# Patient Record
Sex: FEMALE | Race: BLACK OR AFRICAN AMERICAN | NOT HISPANIC OR LATINO | Employment: UNEMPLOYED | URBAN - METROPOLITAN AREA
[De-identification: names, ages, dates, MRNs, and addresses within clinical notes are randomized per-mention and may not be internally consistent; named-entity substitution may affect disease eponyms.]

---

## 2017-08-31 ENCOUNTER — OFFICE VISIT (OUTPATIENT)
Dept: OBSTETRICS AND GYNECOLOGY | Facility: CLINIC | Age: 22
End: 2017-08-31

## 2017-08-31 VITALS
HEIGHT: 68 IN | DIASTOLIC BLOOD PRESSURE: 74 MMHG | WEIGHT: 138 LBS | HEART RATE: 86 BPM | SYSTOLIC BLOOD PRESSURE: 119 MMHG | BODY MASS INDEX: 20.92 KG/M2

## 2017-08-31 DIAGNOSIS — F17.200 TOBACCO DEPENDENCE: ICD-10-CM

## 2017-08-31 DIAGNOSIS — Z01.419 VISIT FOR GYNECOLOGIC EXAMINATION: ICD-10-CM

## 2017-08-31 DIAGNOSIS — Z11.3 SCREENING FOR STD (SEXUALLY TRANSMITTED DISEASE): Primary | ICD-10-CM

## 2017-08-31 PROCEDURE — 99406 BEHAV CHNG SMOKING 3-10 MIN: CPT | Performed by: OBSTETRICS & GYNECOLOGY

## 2017-08-31 PROCEDURE — 99385 PREV VISIT NEW AGE 18-39: CPT | Performed by: OBSTETRICS & GYNECOLOGY

## 2017-08-31 RX ORDER — NORGESTIMATE AND ETHINYL ESTRADIOL 0.25-0.035
1 KIT ORAL DAILY
Qty: 1 PACKAGE | Refills: 11 | Status: SHIPPED | OUTPATIENT
Start: 2017-08-31 | End: 2018-09-17 | Stop reason: HOSPADM

## 2017-09-01 LAB
HBV SURFACE AG SERPL QL IA: NEGATIVE
HIV 1+2 AB+HIV1 P24 AG SERPL QL IA: NON REACTIVE
RPR SER QL: NORMAL

## 2017-09-04 LAB
C TRACH RRNA SPEC QL NAA+PROBE: NEGATIVE
N GONORRHOEA RRNA SPEC QL NAA+PROBE: NEGATIVE
T VAGINALIS RRNA SPEC QL NAA+PROBE: NEGATIVE

## 2017-09-06 LAB
CYTOLOGIST CVX/VAG CYTO: ABNORMAL
CYTOLOGY CVX/VAG DOC THIN PREP: ABNORMAL
DX ICD CODE: ABNORMAL
DX ICD CODE: ABNORMAL
HIV 1 & 2 AB SER-IMP: ABNORMAL
HPV I/H RISK 4 DNA CVX QL PROBE+SIG AMP: POSITIVE
OTHER STN SPEC: ABNORMAL
PATH REPORT.FINAL DX SPEC: ABNORMAL
PATHOLOGIST CVX/VAG CYTO: ABNORMAL
RECOM F/U CVX/VAG CYTO: ABNORMAL
STAT OF ADQ CVX/VAG CYTO-IMP: ABNORMAL

## 2017-09-07 ENCOUNTER — TELEPHONE (OUTPATIENT)
Dept: OBSTETRICS AND GYNECOLOGY | Facility: CLINIC | Age: 22
End: 2017-09-07

## 2017-09-28 ENCOUNTER — PROCEDURE VISIT (OUTPATIENT)
Dept: OBSTETRICS AND GYNECOLOGY | Facility: CLINIC | Age: 22
End: 2017-09-28

## 2017-09-28 VITALS
SYSTOLIC BLOOD PRESSURE: 105 MMHG | HEIGHT: 68 IN | DIASTOLIC BLOOD PRESSURE: 67 MMHG | BODY MASS INDEX: 21.52 KG/M2 | HEART RATE: 78 BPM | WEIGHT: 142 LBS

## 2017-09-28 DIAGNOSIS — R87.612 LGSIL ON PAP SMEAR OF CERVIX: Primary | ICD-10-CM

## 2017-09-28 DIAGNOSIS — F17.200 TOBACCO USE DISORDER: ICD-10-CM

## 2017-09-28 PROCEDURE — 57454 BX/CURETT OF CERVIX W/SCOPE: CPT | Performed by: OBSTETRICS & GYNECOLOGY

## 2017-09-28 PROCEDURE — 99406 BEHAV CHNG SMOKING 3-10 MIN: CPT | Performed by: OBSTETRICS & GYNECOLOGY

## 2017-09-28 NOTE — PROGRESS NOTES
Colposcopy Procedure Note    Indications: Pap smear 1 months ago showed: low-grade squamous intraepithelial neoplasia (LGSIL - encompassing HPV,mild dysplasia,ARELIS I). The prior pap showed no abnormalities.  Prior cervical/vaginal disease: none. Prior cervical treatment: no treatment.    Procedure Details   The risks and benefits of the procedure and Verbal informed consent obtained.    Speculum placed in vagina and excellent visualization of cervix achieved, cervix swabbed x 3 with acetic acid solution.    Findings:  Cervix: acetowhite lesion(s) noted at 10 o'clock; SCJ visualized - lesion at 10 o'clock, endocervical curettage performed, cervical biopsies taken at 10 o'clock, specimen labelled and sent to pathology and hemostasis achieved with Monsel's solution.  Vaginal inspection: normal without visible lesions.  Vulvar colposcopy: vulvar colposcopy not performed.    Specimens: Ectocervical biopsy and endocervical curettage    Complications: none.    Plan:  Specimens labelled and sent to Pathology.  Will base further treatment on Pathology findings.  Treatment options discussed with patient.    Discussed relationship of HPV and tobacco/smoking behaviors with abnormal pap tests.  I discussed quitting and importance of doing so.  Discussed strategies to quit.  Greater than 3 minutes was spent on this topic.    Arnav Bingham MD

## 2017-10-02 LAB
DX ICD CODE: NORMAL
DX ICD CODE: NORMAL
PATH REPORT.FINAL DX SPEC: NORMAL
PATH REPORT.GROSS SPEC: NORMAL
PATH REPORT.SITE OF ORIGIN SPEC: NORMAL
PATHOLOGIST NAME: NORMAL
PAYMENT PROCEDURE: NORMAL

## 2017-10-05 ENCOUNTER — TELEPHONE (OUTPATIENT)
Dept: OBSTETRICS AND GYNECOLOGY | Facility: CLINIC | Age: 22
End: 2017-10-05

## 2017-10-05 NOTE — TELEPHONE ENCOUNTER
----- Message from rAnav Bingham MD sent at 10/5/2017  4:17 PM EDT -----  LAW - Let her know that her testing reveals pre-cancer that likely needs treatment.  Please find out if she can come in tomorrow or Monday to discuss.  Thanks.

## 2017-10-05 NOTE — TELEPHONE ENCOUNTER
----- Message from Arnav Bingham MD sent at 10/5/2017  4:17 PM EDT -----  LAW - Let her know that her testing reveals pre-cancer that likely needs treatment.  Please find out if she can come in tomorrow or Monday to discuss.  Thanks.

## 2017-10-06 ENCOUNTER — OFFICE VISIT (OUTPATIENT)
Dept: OBSTETRICS AND GYNECOLOGY | Facility: CLINIC | Age: 22
End: 2017-10-06

## 2017-10-06 VITALS
WEIGHT: 140 LBS | BODY MASS INDEX: 21.22 KG/M2 | SYSTOLIC BLOOD PRESSURE: 110 MMHG | HEIGHT: 68 IN | DIASTOLIC BLOOD PRESSURE: 60 MMHG

## 2017-10-06 DIAGNOSIS — N87.9 DYSPLASIA OF CERVIX: Primary | ICD-10-CM

## 2017-10-06 PROCEDURE — 99214 OFFICE O/P EST MOD 30 MIN: CPT | Performed by: OBSTETRICS & GYNECOLOGY

## 2017-10-07 NOTE — PROGRESS NOTES
"Subjective   Dionne Ag is a 22 y.o. female.     Cc:  Pap test follow up    History of Present Illness - Patient is a 22 year old female who underwent colposcopy for an abnormal pap test.  The pap was LGSIL.  Patient underwent colposcopy.  The diagnosis was moderate dysplasia on ectocervical biopsy and positive ECC.  Patient presented for follow up.    The following portions of the patient's history were reviewed and updated as appropriate:   She  reports that she has been smoking.  She has been smoking about 0.50 packs per day. She has never used smokeless tobacco. She reports that she drinks alcohol. She reports that she uses illicit drugs, including Marijuana.  Current Outpatient Prescriptions   Medication Sig Dispense Refill   • amphetamine-dextroamphetamine XR (ADDERALL XR) 10 MG 24 hr capsule Take 25 mg by mouth Every Morning.     • famciclovir (FAMVIR) 250 MG tablet Take 1 tablet by mouth 2 (Two) Times a Day. 21 tablet 0   • norgestimate-ethinyl estradiol (SPRINTEC 28) 0.25-35 MG-MCG per tablet Take 1 tablet by mouth Daily. 1 package 11   • UNKNOWN TO PATIENT \"herpes medicine\"       No current facility-administered medications for this visit.      She is allergic to augmentin [amoxicillin-pot clavulanate]..    Review of Systems   Genitourinary: Negative for vaginal bleeding and vaginal discharge.       Objective   Physical Exam   Constitutional: She appears well-developed and well-nourished.   Psychiatric: She has a normal mood and affect. Her behavior is normal. Judgment and thought content normal.   Vitals reviewed.      Assessment/Plan   Dionne was seen today for follow-up.    Diagnoses and all orders for this visit:    Dysplasia of cervix  -  Discussed the implications of the diagnosis.  Discussed the general biopsy and pathology of dysplasia, related to HPV and the natural course.  Discussed risks of smoking and HPV.  I offered patient two strategies for management 1) Conservative management with " colposcopy in 6 months.  Discussed pros/cons.  2) Cervical Conization.  Discussed the nature of the procedure and risks.  Discussed risks of  birth/cervical incompetence.  Discussed cure rate and follow up.    I spent 25 minutes with patient and 20 minutes in face to face counseling.    Arnav Bingham MD

## 2017-12-07 ENCOUNTER — OFFICE VISIT (OUTPATIENT)
Dept: OBSTETRICS AND GYNECOLOGY | Facility: CLINIC | Age: 22
End: 2017-12-07

## 2017-12-07 VITALS
BODY MASS INDEX: 34.02 KG/M2 | SYSTOLIC BLOOD PRESSURE: 118 MMHG | HEART RATE: 87 BPM | HEIGHT: 55 IN | WEIGHT: 147 LBS | DIASTOLIC BLOOD PRESSURE: 82 MMHG

## 2017-12-07 DIAGNOSIS — N89.8 VAGINAL DISCHARGE: Primary | ICD-10-CM

## 2017-12-07 DIAGNOSIS — Z72.0 TOBACCO USE: ICD-10-CM

## 2017-12-07 DIAGNOSIS — Z86.19 H/O HERPES GENITALIS: ICD-10-CM

## 2017-12-07 DIAGNOSIS — N76.4 CARBUNCLE OF VULVA: ICD-10-CM

## 2017-12-07 PROCEDURE — 99406 BEHAV CHNG SMOKING 3-10 MIN: CPT | Performed by: OBSTETRICS & GYNECOLOGY

## 2017-12-07 PROCEDURE — 99214 OFFICE O/P EST MOD 30 MIN: CPT | Performed by: OBSTETRICS & GYNECOLOGY

## 2017-12-07 NOTE — PROGRESS NOTES
"Subjective   Dionne Ag is a 22 y.o. female.     Cc:  Multiple somatic complaints.  Vaginal discharge, recurrent boils, herpes, pap issues, stomach issues, mood issues.    History of Present Illness - 22 year old female with multiple complaints.  Three main issues covered today.  1) Vaginal discharge.  Patient with foul discharge over the past several weeks.  Unsure of the inciting agent or cause.  Patient is not sexually active and no known exposure to STD.  Has not tried any OTC medications.  No recent improvements.  2) vulvar \"boils.\"  Patient has \"waxed\" her vulva for several years.  Recently, developed \"boils\" in the vulvar area.  This is a new issue.  She managed them conservatively and they improved on their own.  Does not think they are related to HSV.  3)  Herpes.  Patient with diagnosis of HSV 1.5 years ago.  She was treated by PCP for what she feels was an unusual course.  Has not had a recurrence.  Feels that other doctors have given her different advice on HSV.  Patient also gave an extended, non-cohesive complaint/discussion of stomach and mood issues, for which she is trying to get help from PCP and other specialists.  Her diet seems suboptimal to me, but she was disagreeable with my view.    The following portions of the patient's history were reviewed and updated as appropriate:   She  has a past medical history of ADD (attention deficit disorder); Herpes; and Substance abuse.  She  reports that she has never smoked. She has never used smokeless tobacco. She reports that she drinks alcohol. She reports that she uses illicit drugs, including Marijuana.  Current Outpatient Prescriptions   Medication Sig Dispense Refill   • amphetamine-dextroamphetamine XR (ADDERALL XR) 10 MG 24 hr capsule Take 25 mg by mouth Every Morning.     • famciclovir (FAMVIR) 250 MG tablet Take 1 tablet by mouth 2 (Two) Times a Day. 21 tablet 0   • norgestimate-ethinyl estradiol (SPRINTEC 28) 0.25-35 MG-MCG per tablet Take 1 " "tablet by mouth Daily. 1 package 11   • UNKNOWN TO PATIENT \"herpes medicine\"       No current facility-administered medications for this visit.      She is allergic to augmentin [amoxicillin-pot clavulanate]..    Review of Systems   Constitutional: Negative for chills and fever.   Gastrointestinal: Positive for nausea. Negative for vomiting.   Genitourinary: Positive for genital sores, vaginal discharge and vaginal pain.   Psychiatric/Behavioral: Positive for decreased concentration. The patient is nervous/anxious.        Objective   Physical Exam   Constitutional: She appears well-developed and well-nourished.   Abdominal: Soft. There is no tenderness. There is no rebound and no guarding.   Genitourinary: Pelvic exam was performed with patient supine. There is no rash, tenderness or lesion on the right labia. There is no rash, tenderness or lesion on the left labia. Cervix exhibits no motion tenderness and no friability. Vaginal discharge found.   Musculoskeletal: Normal range of motion. She exhibits no tenderness.   Neurological: She is alert. Coordination normal.   Skin: Skin is warm and dry.   Psychiatric: She has a normal mood and affect. Her behavior is normal. Judgment and thought content normal.   Vitals reviewed.      Assessment/Plan   Dionne was seen today for consult.    Diagnoses and all orders for this visit:    Vaginal discharge  -     NuSwab Vaginitis (VG) - Swab, Cervix  -     Await cultures.  Discussed inciting agents.    H/O herpes genitalis       -      Discussed either taking medication with outbreaks or suppressive therapy.  Discussed signs/symptoms of HSV onset.    Tobacco Use       -      Discussed quitting.    Carbuncle of vulva       -      Discussed perineal hygiene and reasons for vulvar infections.    I spent 30 minutes with patient and 20 minutes in counseling.  Discussed the importance of quitting smoking.  I spent 3 to 5 minutes discussing.              "

## 2017-12-11 ENCOUNTER — TELEPHONE (OUTPATIENT)
Dept: OBSTETRICS AND GYNECOLOGY | Facility: CLINIC | Age: 22
End: 2017-12-11

## 2017-12-11 LAB
A VAGINAE DNA VAG QL NAA+PROBE: ABNORMAL SCORE
BVAB2 DNA VAG QL NAA+PROBE: ABNORMAL SCORE
C ALBICANS DNA VAG QL NAA+PROBE: POSITIVE
C GLABRATA DNA VAG QL NAA+PROBE: NEGATIVE
MEGA1 DNA VAG QL NAA+PROBE: ABNORMAL SCORE
T VAGINALIS RRNA SPEC QL NAA+PROBE: NEGATIVE

## 2017-12-11 RX ORDER — FLUCONAZOLE 150 MG/1
150 TABLET ORAL DAILY
Qty: 1 TABLET | Refills: 0 | Status: SHIPPED | OUTPATIENT
Start: 2017-12-11 | End: 2018-04-18

## 2017-12-11 NOTE — TELEPHONE ENCOUNTER
----- Message from Arnav Bingham MD sent at 12/11/2017  2:59 PM EST -----  Miriam - Let her know that she has a yeast infection.  I called her in Diflucan.

## 2017-12-12 ENCOUNTER — TELEPHONE (OUTPATIENT)
Dept: OBSTETRICS AND GYNECOLOGY | Facility: CLINIC | Age: 22
End: 2017-12-12

## 2018-04-18 ENCOUNTER — OFFICE VISIT (OUTPATIENT)
Dept: OBSTETRICS AND GYNECOLOGY | Facility: CLINIC | Age: 23
End: 2018-04-18

## 2018-04-18 ENCOUNTER — TELEPHONE (OUTPATIENT)
Dept: OBSTETRICS AND GYNECOLOGY | Facility: CLINIC | Age: 23
End: 2018-04-18

## 2018-04-18 VITALS
HEART RATE: 99 BPM | BODY MASS INDEX: 142.24 KG/M2 | DIASTOLIC BLOOD PRESSURE: 79 MMHG | SYSTOLIC BLOOD PRESSURE: 110 MMHG | WEIGHT: 145 LBS

## 2018-04-18 DIAGNOSIS — Z11.3 SCREENING EXAMINATION FOR STD (SEXUALLY TRANSMITTED DISEASE): ICD-10-CM

## 2018-04-18 DIAGNOSIS — R35.0 FREQUENT URINATION: Primary | ICD-10-CM

## 2018-04-18 DIAGNOSIS — N89.8 VAGINAL DISCHARGE: ICD-10-CM

## 2018-04-18 DIAGNOSIS — Z23 NEED FOR HPV VACCINE: ICD-10-CM

## 2018-04-18 DIAGNOSIS — N87.0 DYSPLASIA OF CERVIX, LOW GRADE (CIN 1): ICD-10-CM

## 2018-04-18 LAB
BILIRUB BLD-MCNC: NEGATIVE MG/DL
GLUCOSE UR STRIP-MCNC: NEGATIVE MG/DL
KETONES UR QL: NEGATIVE
LEUKOCYTE EST, POC: ABNORMAL
NITRITE UR-MCNC: NEGATIVE MG/ML
PH UR: 7.5 [PH] (ref 5–8)
PROT UR STRIP-MCNC: NEGATIVE MG/DL
RBC # UR STRIP: ABNORMAL /UL
SP GR UR: 1.01 (ref 1–1.03)
UROBILINOGEN UR QL: NORMAL

## 2018-04-18 PROCEDURE — 90471 IMMUNIZATION ADMIN: CPT | Performed by: OBSTETRICS & GYNECOLOGY

## 2018-04-18 PROCEDURE — 57454 BX/CURETT OF CERVIX W/SCOPE: CPT | Performed by: OBSTETRICS & GYNECOLOGY

## 2018-04-18 PROCEDURE — 99213 OFFICE O/P EST LOW 20 MIN: CPT | Performed by: OBSTETRICS & GYNECOLOGY

## 2018-04-18 PROCEDURE — 90651 9VHPV VACCINE 2/3 DOSE IM: CPT | Performed by: OBSTETRICS & GYNECOLOGY

## 2018-04-18 NOTE — TELEPHONE ENCOUNTER
Altagracia    Please have her come to the office today at 3:15 today.    Zachery          ----- Message from Altagracia Leach sent at 4/18/2018  8:20 AM EDT -----  Contact: Patient   Patient thinks she may have a uti but isn't sure.  She is complaining of urinary frequency and odor (not sure if vaginal or if her urine has an odor).  No pain or burning.  Symptoms x 2 days.     [phone]

## 2018-04-19 ENCOUNTER — TELEPHONE (OUTPATIENT)
Dept: OBSTETRICS AND GYNECOLOGY | Facility: CLINIC | Age: 23
End: 2018-04-19

## 2018-04-19 RX ORDER — NITROFURANTOIN 25; 75 MG/1; MG/1
100 CAPSULE ORAL EVERY 12 HOURS SCHEDULED
Qty: 14 CAPSULE | Refills: 0 | Status: SHIPPED | OUTPATIENT
Start: 2018-04-19 | End: 2018-04-26

## 2018-04-19 NOTE — PROGRESS NOTES
Subjective   Dionne Ag is a 23 y.o. female.     Cc:  Abnormal pap follow up, urine issues and vaginal discharge    History of Present Illness - Patient is a 23 year old female who presents with several issues.  Patient has a several day history of vaginal discharge and frequency of urine.  No STD exposure.  Vaginal discharge is clear with odor.  Patient denies fevers/chills.  No irregular bleeding.  Patient with abnormal pap/colposcopy approximately 6 months ago.  Patient was offered treatment but declined.  She is here for colposcopic follow up.    The following portions of the patient's history were reviewed and updated as appropriate:   She  has a past medical history of ADD (attention deficit disorder); Herpes; and Substance abuse.  She  reports that she has never smoked. She has never used smokeless tobacco. She reports that she drinks alcohol. She reports that she uses drugs, including Marijuana.  Current Outpatient Prescriptions   Medication Sig Dispense Refill   • amphetamine-dextroamphetamine XR (ADDERALL XR) 10 MG 24 hr capsule Take 25 mg by mouth Every Morning.     • norgestimate-ethinyl estradiol (SPRINTEC 28) 0.25-35 MG-MCG per tablet Take 1 tablet by mouth Daily. 1 package 11   • nitrofurantoin, macrocrystal-monohydrate, (MACROBID) 100 MG capsule Take 1 capsule by mouth Every 12 (Twelve) Hours for 7 days. 14 capsule 0     No current facility-administered medications for this visit.      She is allergic to augmentin [amoxicillin-pot clavulanate]..    Review of Systems    Objective   Physical Exam   Constitutional: She appears well-developed and well-nourished.   Genitourinary: Pelvic exam was performed with patient supine. There is no tenderness or lesion on the right labia. There is no tenderness or lesion on the left labia. Cervix exhibits no motion tenderness, no discharge and no friability. No bleeding in the vagina. No foreign body in the vagina. Vaginal discharge found.   Neurological: She is  alert. Coordination normal.   Skin: Skin is warm and dry.   Psychiatric: She has a normal mood and affect. Her behavior is normal. Judgment and thought content normal.   Vitals reviewed.  Colposcopy Procedure Note    Indications: Pap smear 9 months ago showed: low-grade squamous intraepithelial neoplasia (LGSIL - encompassing HPV,mild dysplasia,ARELIS I). The prior pap showed no abnormalities.  Prior cervical/vaginal disease: ARELIS I to II. Prior cervical treatment: no treatment.    Procedure Details   The risks and benefits of the procedure and Verbal informed consent obtained.    Speculum placed in vagina and excellent visualization of cervix achieved, cervix swabbed x 3 with acetic acid solution.    Findings:  Cervix: no visible lesions; SCJ visualized - lesion at 7 o'clock, endocervical curettage performed, cervical biopsies taken at 7 o'clock, specimen labelled and sent to pathology and hemostasis achieved with Monsel's solution.  Vaginal inspection: normal without visible lesions.  Vulvar colposcopy: vulvar colposcopy not performed.    Specimens: Ectocervical biopsy and ECC    Complications: none.    Plan:  Specimens labelled and sent to Pathology.  Will base further treatment on Pathology findings.  Treatment options discussed with patient.    Assessment/Plan   Dionne was seen today for procedure.    Diagnoses and all orders for this visit:    Frequent urination  -     POC Urinalysis Dipstick, Automated  -     Urine Culture - Urine, Urine, Clean Catch  -     nitrofurantoin, macrocrystal-monohydrate, (MACROBID) 100 MG capsule; Take 1 capsule by mouth Every 12 (Twelve) Hours for 7 days.    Need for HPV vaccine  -     HPV 9-Valent Recomb Vaccine suspension 0.5 mL; Inject 0.5 mL into the shoulder, thigh, or buttocks 1 (One) Time.    Dysplasia of cervix, low grade (ARELIS 1)  -     Reference Histopathology  -     If severe dysplasia is present, patient will need to consider treatment.    Vaginal discharge/ Screening  examination for STD (sexually transmitted disease)  -     [facility] VG+ - Swab, Vagina  -     Await cultures.    Arnav Bingham MD

## 2018-04-19 NOTE — TELEPHONE ENCOUNTER
Altagracia    Let her know this was called in.    Zachery        ----- Message from Altagracia Leach sent at 4/19/2018 10:10 AM EDT -----  Contact: Patient   Patient states she's waiting on an RX to be sent to her pharmacy for a uti

## 2018-04-20 ENCOUNTER — TELEPHONE (OUTPATIENT)
Dept: OBSTETRICS AND GYNECOLOGY | Facility: CLINIC | Age: 23
End: 2018-04-20

## 2018-04-20 NOTE — TELEPHONE ENCOUNTER
LAW    Let her know that spotting can be normal for one week after colposcopy.    Thanks    Zachery          Pt called stating she was still spotting from her PAP, pt states that she has been spotting for a couple days now and is wondering if this is normal. Please advise.     Call back: [phone]

## 2018-04-20 NOTE — PROGRESS NOTES
Pt received Hpv Injection, office supplied, no reaction, Lot#  [id number] Exp Date 04-29-20, ndc 6458-5322-86. Rto 2 months for next Injection.

## 2018-04-21 LAB
BACTERIA UR CULT: ABNORMAL
BACTERIA UR CULT: ABNORMAL
OTHER ANTIBIOTIC SUSC ISLT: ABNORMAL

## 2018-04-23 ENCOUNTER — TELEPHONE (OUTPATIENT)
Dept: OBSTETRICS AND GYNECOLOGY | Facility: CLINIC | Age: 23
End: 2018-04-23

## 2018-04-23 LAB
A VAGINAE DNA VAG QL NAA+PROBE: NORMAL SCORE
BVAB2 DNA VAG QL NAA+PROBE: NORMAL
C ALBICANS DNA VAG QL NAA+PROBE: NORMAL
C GLABRATA DNA VAG QL NAA+PROBE: NORMAL
C TRACH RRNA SPEC QL NAA+PROBE: NORMAL
MEGA1 DNA VAG QL NAA+PROBE: NORMAL
N GONORRHOEA RRNA SPEC QL NAA+PROBE: NORMAL
T VAGINALIS RRNA SPEC QL NAA+PROBE: NORMAL

## 2018-04-23 RX ORDER — METRONIDAZOLE 500 MG/1
500 TABLET ORAL 2 TIMES DAILY
Qty: 14 TABLET | Refills: 0 | Status: SHIPPED | OUTPATIENT
Start: 2018-04-23 | End: 2018-05-09

## 2018-05-04 ENCOUNTER — TELEPHONE (OUTPATIENT)
Dept: OBSTETRICS AND GYNECOLOGY | Facility: CLINIC | Age: 23
End: 2018-05-04

## 2018-05-04 NOTE — TELEPHONE ENCOUNTER
Balbina    Can you please add Dionne to my schedule for discussion of abnormal pap.  I spoke with her and told her that someone would call to schedule her.  She can be worked in to my schedule next week.    Thanks    Zachery

## 2018-05-09 ENCOUNTER — OFFICE VISIT (OUTPATIENT)
Dept: OBSTETRICS AND GYNECOLOGY | Facility: CLINIC | Age: 23
End: 2018-05-09

## 2018-05-09 VITALS
BODY MASS INDEX: 33.09 KG/M2 | HEIGHT: 55 IN | SYSTOLIC BLOOD PRESSURE: 122 MMHG | DIASTOLIC BLOOD PRESSURE: 74 MMHG | WEIGHT: 143 LBS

## 2018-05-09 DIAGNOSIS — D06.9 SEVERE DYSPLASIA OF CERVIX: Primary | ICD-10-CM

## 2018-05-09 PROCEDURE — 99213 OFFICE O/P EST LOW 20 MIN: CPT | Performed by: OBSTETRICS & GYNECOLOGY

## 2018-05-09 RX ORDER — SULFAMETHOXAZOLE AND TRIMETHOPRIM 800; 160 MG/1; MG/1
1 TABLET ORAL
COMMUNITY
Start: 2018-05-09 | End: 2018-05-14

## 2018-05-10 NOTE — PROGRESS NOTES
Subjective   Dionne Ag is a 23 y.o. female.     Cc:  Abnormal pap    History of Present Illness - 23 year old female here to discuss test results.  Patient had abnormal pap and then colposcopy with biopsy that revealed severe dysplasia.  This is her first diagnosis; therefore, no previous treatment for abnormal pap tests.   She is interested in future childbearing.    The following portions of the patient's history were reviewed and updated as appropriate:   She  has a past medical history of ADD (attention deficit disorder); Herpes; and Substance abuse.  She  reports that she has never smoked. She has never used smokeless tobacco. She reports that she drinks alcohol. She reports that she uses drugs, including Marijuana.  Current Outpatient Prescriptions   Medication Sig Dispense Refill   • sulfamethoxazole-trimethoprim (BACTRIM DS,SEPTRA DS) 800-160 MG per tablet Take 1 tablet by mouth.     • amphetamine-dextroamphetamine XR (ADDERALL XR) 10 MG 24 hr capsule Take 25 mg by mouth Every Morning.     • norgestimate-ethinyl estradiol (SPRINTEC 28) 0.25-35 MG-MCG per tablet Take 1 tablet by mouth Daily. 1 package 11     No current facility-administered medications for this visit.      She is allergic to augmentin [amoxicillin-pot clavulanate]..    Review of Systems   Genitourinary: Negative for menstrual problem and pelvic pain.       Objective   Physical Exam   Constitutional: She appears well-developed and well-nourished.   Skin: Skin is warm and dry.   Psychiatric: She has a normal mood and affect. Her behavior is normal. Judgment and thought content normal.   Vitals reviewed.      Assessment/Plan   Dionne was seen today for follow-up.    Diagnoses and all orders for this visit:    Severe dysplasia of cervix  -  Discussed findings at length.  Patient could consider conservative measure or proceed with LEEP or CKC.  Discussed pros/cons of each procedure.  Discussed risks of incompetent cervix/ birth.   Discussed LEEP is generally less invasive and done without local anesthesia.  Pamphlets given.  Questions answered.  Patient appeared to record some of the encounter.    I spent 15 minutes with patient and 10 minutes in face to face contact regarding above counseling.    Arnav Bingham MD

## 2018-06-18 ENCOUNTER — CLINICAL SUPPORT (OUTPATIENT)
Dept: OBSTETRICS AND GYNECOLOGY | Facility: CLINIC | Age: 23
End: 2018-06-18

## 2018-06-18 VITALS
HEIGHT: 68 IN | DIASTOLIC BLOOD PRESSURE: 72 MMHG | HEART RATE: 75 BPM | BODY MASS INDEX: 21.98 KG/M2 | WEIGHT: 145 LBS | SYSTOLIC BLOOD PRESSURE: 112 MMHG

## 2018-06-18 DIAGNOSIS — Z23 NEED FOR HPV VACCINATION: Primary | ICD-10-CM

## 2018-06-18 PROCEDURE — 90471 IMMUNIZATION ADMIN: CPT | Performed by: OBSTETRICS & GYNECOLOGY

## 2018-06-18 PROCEDURE — 90649 4VHPV VACCINE 3 DOSE IM: CPT | Performed by: OBSTETRICS & GYNECOLOGY

## 2018-06-18 NOTE — PROGRESS NOTES
Patient here for gardasil injection. Patient did not have a reaction to the injection or medication. Injection on the left deltoid.  LOT: [id number]  EXP: 2020 JUN 25

## 2018-06-18 NOTE — PATIENT INSTRUCTIONS
Patient here for gardasil injection. Patient did not have a reaction to the injection or medication. Injection on the left deltoid.  LOT: [id number]  EXP: 2020 JUN 25

## 2018-09-17 ENCOUNTER — PROCEDURE VISIT (OUTPATIENT)
Dept: OBSTETRICS AND GYNECOLOGY | Facility: CLINIC | Age: 23
End: 2018-09-17

## 2018-09-17 VITALS
BODY MASS INDEX: 21.67 KG/M2 | HEART RATE: 83 BPM | SYSTOLIC BLOOD PRESSURE: 121 MMHG | DIASTOLIC BLOOD PRESSURE: 75 MMHG | WEIGHT: 143 LBS | HEIGHT: 68 IN

## 2018-09-17 DIAGNOSIS — R87.613 HGSIL ON CYTOLOGIC SMEAR OF CERVIX: Primary | ICD-10-CM

## 2018-09-17 PROCEDURE — 57454 BX/CURETT OF CERVIX W/SCOPE: CPT | Performed by: OBSTETRICS & GYNECOLOGY

## 2018-09-17 NOTE — PROGRESS NOTES
Colposcopy Procedure Note    Indications: Pap smear 12 months ago showed: low-grade squamous intraepithelial neoplasia (LGSIL - encompassing HPV,mild dysplasia,ARELIS I). The prior pap showed unknown findings.  Prior cervical/vaginal disease: ARELIS 3. Prior cervical treatment: no treatment.    Procedure Details   The risks and benefits of the procedure and Verbal informed consent obtained.    Speculum placed in vagina and excellent visualization of cervix achieved, cervix swabbed x 3 with acetic acid solution.    Findings:  Cervix: acetowhite lesion(s) noted at various places in transformation zone; endocervical curettage performed, cervical biopsies taken at 1 o'clock, specimen labelled and sent to pathology and hemostasis achieved with Monsel's solution.  Vaginal inspection: normal without visible lesions.  Vulvar colposcopy: vulvar colposcopy not performed.    Specimens: ectocervical biopsy and endocervical curettage    Complications: none.  Patient tolerated the procedure well without complications.    Plan:  Specimens labelled and sent to Pathology.  Will base further treatment on Pathology findings.  Treatment options discussed with patient.  Patient is excellent candidate for LEEP unless ECC is positive or worse disease found on biopsy.    Arnav Bingham MD

## 2018-09-19 LAB
DX ICD CODE: NORMAL
PATH REPORT.FINAL DX SPEC: NORMAL
PATH REPORT.GROSS SPEC: NORMAL
PATH REPORT.SITE OF ORIGIN SPEC: NORMAL
PATHOLOGIST NAME: NORMAL
PAYMENT PROCEDURE: NORMAL

## 2018-10-04 ENCOUNTER — TELEPHONE (OUTPATIENT)
Dept: OBSTETRICS AND GYNECOLOGY | Facility: CLINIC | Age: 23
End: 2018-10-04

## 2018-10-04 NOTE — TELEPHONE ENCOUNTER
Pt aware of results and is scheduled for a repeat pap [id number]. 10-4-18/lw   ----- Message from Arnav Bingham MD sent at 10/4/2018  4:13 PM EDT -----  LAW - Let her know that her last set of tests actually were all normal.  I have given these some consideration.  I think it is reasonable that she return in November/December for a pap test.  If this is normal, she can then resume yearly check ups.  Thanks.

## 2018-10-18 ENCOUNTER — CLINICAL SUPPORT (OUTPATIENT)
Dept: OBSTETRICS AND GYNECOLOGY | Facility: CLINIC | Age: 23
End: 2018-10-18

## 2018-10-18 VITALS
SYSTOLIC BLOOD PRESSURE: 124 MMHG | HEART RATE: 94 BPM | DIASTOLIC BLOOD PRESSURE: 78 MMHG | WEIGHT: 143 LBS | BODY MASS INDEX: 21.67 KG/M2 | HEIGHT: 68 IN

## 2018-10-18 DIAGNOSIS — Z23 NEED FOR HPV VACCINE: Primary | ICD-10-CM

## 2018-10-18 PROCEDURE — 96372 THER/PROPH/DIAG INJ SC/IM: CPT | Performed by: OBSTETRICS & GYNECOLOGY

## 2018-10-18 PROCEDURE — 90651 9VHPV VACCINE 2/3 DOSE IM: CPT | Performed by: OBSTETRICS & GYNECOLOGY

## 2018-10-18 NOTE — PROGRESS NOTES
Patient here for Gardisil injection. Patient did not have a reaction to the injection or medication. Injection on the right deltoid.  LOT: [id number]  EXP: [id number]

## 2019-05-06 ENCOUNTER — OFFICE VISIT (OUTPATIENT)
Dept: OBSTETRICS AND GYNECOLOGY | Facility: CLINIC | Age: 24
End: 2019-05-06

## 2019-05-06 VITALS
WEIGHT: 150 LBS | HEIGHT: 68 IN | HEART RATE: 87 BPM | DIASTOLIC BLOOD PRESSURE: 70 MMHG | SYSTOLIC BLOOD PRESSURE: 126 MMHG | BODY MASS INDEX: 22.73 KG/M2

## 2019-05-06 DIAGNOSIS — Z01.419 VISIT FOR GYNECOLOGIC EXAMINATION: ICD-10-CM

## 2019-05-06 DIAGNOSIS — R87.612 LGSIL ON PAP SMEAR OF CERVIX: ICD-10-CM

## 2019-05-06 DIAGNOSIS — N92.6 IRREGULAR MENSES: ICD-10-CM

## 2019-05-06 DIAGNOSIS — Z01.419 PAP SMEAR, AS PART OF ROUTINE GYNECOLOGICAL EXAMINATION: ICD-10-CM

## 2019-05-06 DIAGNOSIS — Z11.3 SCREEN FOR STD (SEXUALLY TRANSMITTED DISEASE): Primary | ICD-10-CM

## 2019-05-06 LAB
B-HCG UR QL: NEGATIVE
INTERNAL NEGATIVE CONTROL: NEGATIVE
INTERNAL POSITIVE CONTROL: POSITIVE
Lab: NORMAL

## 2019-05-06 PROCEDURE — 99395 PREV VISIT EST AGE 18-39: CPT | Performed by: OBSTETRICS & GYNECOLOGY

## 2019-05-06 PROCEDURE — 81025 URINE PREGNANCY TEST: CPT | Performed by: OBSTETRICS & GYNECOLOGY

## 2019-05-07 LAB
CONV .: NORMAL
CYTOLOGIST CVX/VAG CYTO: NORMAL
CYTOLOGY CVX/VAG DOC CYTO: NORMAL
CYTOLOGY CVX/VAG DOC THIN PREP: NORMAL
DX ICD CODE: NORMAL
HAV IGM SERPL QL IA: NEGATIVE
HBV CORE IGM SERPL QL IA: NEGATIVE
HBV SURFACE AG SERPL QL IA: NEGATIVE
HCV AB S/CO SERPL IA: <0.1 S/CO RATIO (ref 0–0.9)
HIV 1 & 2 AB SER-IMP: NORMAL
HIV 1+2 AB+HIV1 P24 AG SERPL QL IA: NON REACTIVE
OTHER STN SPEC: NORMAL
RPR SER QL: NORMAL
STAT OF ADQ CVX/VAG CYTO-IMP: NORMAL

## 2019-05-09 ENCOUNTER — TELEPHONE (OUTPATIENT)
Dept: OBSTETRICS AND GYNECOLOGY | Facility: CLINIC | Age: 24
End: 2019-05-09

## 2019-05-09 LAB
C TRACH RRNA SPEC QL NAA+PROBE: NEGATIVE
N GONORRHOEA RRNA SPEC QL NAA+PROBE: NEGATIVE
T VAGINALIS RRNA VAG QL NAA+PROBE: NEGATIVE

## 2020-11-18 ENCOUNTER — OFFICE VISIT (OUTPATIENT)
Dept: OBSTETRICS AND GYNECOLOGY | Facility: CLINIC | Age: 25
End: 2020-11-18

## 2020-11-18 VITALS
SYSTOLIC BLOOD PRESSURE: 120 MMHG | BODY MASS INDEX: 24.55 KG/M2 | HEIGHT: 68 IN | WEIGHT: 162 LBS | DIASTOLIC BLOOD PRESSURE: 62 MMHG

## 2020-11-18 DIAGNOSIS — Z01.419 VISIT FOR GYNECOLOGIC EXAMINATION: Primary | ICD-10-CM

## 2020-11-18 DIAGNOSIS — N87.1 HIGH GRADE SQUAMOUS INTRAEPITHELIAL LESION (HGSIL), GRADE 2 CIN, ON BIOPSY OF CERVIX: ICD-10-CM

## 2020-11-18 PROCEDURE — 99395 PREV VISIT EST AGE 18-39: CPT | Performed by: OBSTETRICS & GYNECOLOGY

## 2020-11-18 RX ORDER — DEXTROAMPHETAMINE SACCHARATE, AMPHETAMINE ASPARTATE MONOHYDRATE, DEXTROAMPHETAMINE SULFATE AND AMPHETAMINE SULFATE 6.25; 6.25; 6.25; 6.25 MG/1; MG/1; MG/1; MG/1
CAPSULE, EXTENDED RELEASE ORAL
COMMUNITY
Start: 2020-11-13 | End: 2023-02-13

## 2020-11-18 RX ORDER — HYDROXYZINE HYDROCHLORIDE 25 MG/1
TABLET, FILM COATED ORAL
COMMUNITY
Start: 2020-11-13 | End: 2023-02-13

## 2020-11-18 RX ORDER — TRAZODONE HYDROCHLORIDE 50 MG/1
TABLET ORAL
COMMUNITY
Start: 2020-11-17 | End: 2023-02-13

## 2020-11-18 NOTE — PROGRESS NOTES
[facility]  [address], [address]  [place], [place] [address]  Phone: [phone] / Fax:  [phone]      [address] [facility] [address]    Starr Francis MD (Inactive)    Chief Complaint   Patient presents with   • Gynecologic Exam     Annual Exam, last pap 5--19 NL..       Dionne Ag is here for annual gynecologic exam.  HPI - Patient with regular cycles.  She was diagnosed and treated for Chlamydia approximately 4 to 6 months ago.  She denies discharge.  She has not had test of cure.  She declines contraception.  She has a history of HGSIL in the past 2 years.  Last pap was normal.  She was treated conservatively with close follow up.    Past Medical History:   Diagnosis Date   • Abnormal Pap smear of cervix    • ADD (attention deficit disorder)    • Cervical dysplasia    • Chlamydia    • Herpes    • HPV (human papilloma virus) infection    • Substance abuse ([facility])        Past Surgical History:   Procedure Laterality Date   • WISDOM TOOTH EXTRACTION         Allergies   Allergen Reactions   • Amoxicillin-Pot Clavulanate Unknown (See Comments) and Other (See Comments)       Social History     Socioeconomic History   • Marital status: Single     Spouse name: Not on file   • Number of children: Not on file   • Years of education: Not on file   • Highest education level: Not on file   Tobacco Use   • Smoking status: Never Smoker   • Smokeless tobacco: Never Used   Substance and Sexual Activity   • Alcohol use: Yes     Comment: occasional    • Drug use: Yes     Types: Marijuana   • Sexual activity: Not Currently     Partners: Male     Birth control/protection: None       Family History   Problem Relation Age of Onset   • Diabetes Father    • Hypertension Father    • Diabetes Mother    • Hypertension Mother        Patient's last menstrual period was 10/28/2020.    OB History        1    Para        Term                AB   1    Living   0       SAB   1     "TAB        Ectopic        Molar        Multiple        Live Births                    Vitals:    11/18/20 1347   BP: 120/62   Weight: 73.5 kg (162 lb)   Height: 172.7 cm (68\")       Physical Exam  Constitutional:       Appearance: Normal appearance. She is well-developed.   Genitourinary:      Pelvic exam was performed with patient in the lithotomy position.      Urethra, bladder, vagina, cervix and uterus normal.      Right adnexa not tender or full.      Left adnexa not tender or full.   HENT:      Right Ear: External ear normal.      Left Ear: External ear normal.      Nose: Nose normal.   Eyes:      Conjunctiva/sclera: Conjunctivae normal.   Neck:      Musculoskeletal: Normal range of motion and neck supple.      Thyroid: No thyromegaly.   Cardiovascular:      Rate and Rhythm: Normal rate and regular rhythm.      Heart sounds: Normal heart sounds.   Pulmonary:      Effort: Pulmonary effort is normal.      Breath sounds: No stridor. No wheezing.   Chest:      Breasts:         Right: No mass or nipple discharge.         Left: No mass or nipple discharge.   Abdominal:      Palpations: Abdomen is soft. There is no mass.      Tenderness: There is no guarding or rebound.   Musculoskeletal: Normal range of motion.   Neurological:      Mental Status: She is alert.      Coordination: Coordination normal.   Skin:     General: Skin is warm and dry.   Psychiatric:         Mood and Affect: Mood normal.         Behavior: Behavior normal.         Thought Content: Thought content normal.         Judgment: Judgment normal.   Vitals signs and nursing note reviewed. Exam conducted with a chaperone present.         Diagnoses and all orders for this visit:    1. Visit for gynecologic examination (Primary)  -     Chlamydia trachomatis, Neisseria gonorrhoeae, Trichomonas vaginalis, PCR - Swab, Vagina  -     Discussed importance of regular screening and breast awareness.    2. High grade squamous intraepithelial lesion (HGSIL), grade 2 " ARELIS, on biopsy of cervix  -     IGP, Rfx Aptima HPV ASCU  -     If pap abnormal, colposcopy.  If normal, repeat in one year.        Arnav Bingham MD

## 2020-11-20 ENCOUNTER — TELEPHONE (OUTPATIENT)
Dept: OBSTETRICS AND GYNECOLOGY | Facility: CLINIC | Age: 25
End: 2020-11-20

## 2020-11-20 LAB
C TRACH RRNA SPEC QL NAA+PROBE: NEGATIVE
CONV .: NORMAL
CYTOLOGIST CVX/VAG CYTO: NORMAL
CYTOLOGY CVX/VAG DOC CYTO: NORMAL
CYTOLOGY CVX/VAG DOC THIN PREP: NORMAL
DX ICD CODE: NORMAL
HIV 1 & 2 AB SER-IMP: NORMAL
N GONORRHOEA RRNA SPEC QL NAA+PROBE: NEGATIVE
OTHER STN SPEC: NORMAL
STAT OF ADQ CVX/VAG CYTO-IMP: NORMAL
T VAGINALIS DNA SPEC QL NAA+PROBE: NEGATIVE

## 2023-02-13 ENCOUNTER — OFFICE VISIT (OUTPATIENT)
Dept: OBSTETRICS AND GYNECOLOGY | Age: 28
End: 2023-02-13
Payer: MEDICAID

## 2023-02-13 VITALS
WEIGHT: 157.2 LBS | DIASTOLIC BLOOD PRESSURE: 60 MMHG | HEIGHT: 68 IN | BODY MASS INDEX: 23.82 KG/M2 | SYSTOLIC BLOOD PRESSURE: 120 MMHG

## 2023-02-13 DIAGNOSIS — Z20.2 EXPOSURE TO STD: ICD-10-CM

## 2023-02-13 DIAGNOSIS — A60.04 HERPES SIMPLEX VULVOVAGINITIS: ICD-10-CM

## 2023-02-13 DIAGNOSIS — Z01.419 ENCOUNTER FOR GYNECOLOGICAL EXAMINATION: Primary | ICD-10-CM

## 2023-02-13 DIAGNOSIS — R59.0 ENLARGED LYMPH NODE IN NECK: ICD-10-CM

## 2023-02-13 PROBLEM — Z87.410 HISTORY OF CERVICAL DYSPLASIA: Status: ACTIVE | Noted: 2023-02-13

## 2023-02-13 PROCEDURE — 2014F MENTAL STATUS ASSESS: CPT | Performed by: OBSTETRICS & GYNECOLOGY

## 2023-02-13 PROCEDURE — 99395 PREV VISIT EST AGE 18-39: CPT | Performed by: OBSTETRICS & GYNECOLOGY

## 2023-02-13 PROCEDURE — 3008F BODY MASS INDEX DOCD: CPT | Performed by: OBSTETRICS & GYNECOLOGY

## 2023-02-13 RX ORDER — VALACYCLOVIR HYDROCHLORIDE 500 MG/1
500 TABLET, FILM COATED ORAL DAILY
Qty: 30 TABLET | Refills: 11 | Status: SHIPPED | OUTPATIENT
Start: 2023-02-13 | End: 2023-03-15

## 2023-02-13 NOTE — PROGRESS NOTES
.  Subjective     Chief Complaint   Patient presents with   • Gynecologic Exam     New GYN, Annual exam, Last Pap 2020 NEG, Pt wanting to discuss Herpes virus and pt states she is having severe back pain during her periods        History of Present Illness    Dionne Ag is a 27 y.o.  who presents for annual exam. She is a new pt for this md and office.   Her menses are monthly lasting 4 to 7 days    She is a  in a restaurant. She is currently in a relationship using condoms for contraception.   She has a hx of genital herpes. She has a few outbreaks per year.     She also complains of left neck mass present for the last year to 2. She notes it has gotten larger with time. She denies pain.     Obstetric History:  OB History        1    Para        Term                AB   1    Living   0       SAB   1    IAB        Ectopic        Molar        Multiple        Live Births                   Menstrual History:     Patient's last menstrual period was 2023 (exact date).         Current contraception: condoms  History of abnormal Pap smear: yes - had ARELIS 2 that resolved with f/u  Received Gardasil immunization: yes  Perform regular self breast exam: no  Family history of uterine or ovarian cancer: no, pt has a limited family hx  Family History of colon cancer: no  Family history of breast cancer: no    Mammogram: not indicated.  Colonoscopy: not indicated.  DEXA: not indicated.    Exercise: moderately active  Calcium/Vitamin D: adequate intake    The following portions of the patient's history were reviewed and updated as appropriate: allergies, current medications, past family history, past medical history, past social history, past surgical history and problem list.    Review of Systems    Review of Systems   Constitutional: Negative for fatigue.   Respiratory: Negative for shortness of breath.    Gastrointestinal: Negative for abdominal pain.   Genitourinary: Positive for occ HSV  "outbreaks; Negative for abnormal bleeding  Neurological: Negative for headaches.   Psychiatric/Behavioral: Negative for dysphoric mood.         Objective   Physical Exam    /60   Ht 172.7 cm (68\")   Wt 71.3 kg (157 lb 3.2 oz)   LMP 02/11/2023 (Exact Date)   BMI 23.90 kg/m²   General:   Alert, in no distress   Heart: regular rate and rhythm   Lungs: clear to auscultation bilaterally   Breast: Inspection negative; no masses, retractions, nipple discharge or axillary adenopathy in either breast   Neck: Supple, no thyromegaly; there is a 1.5 cm firm lymph noted in left infra auricular region   Abdomen: Soft, no tenderness or guarding   Pelvis: External genitalia: normal general appearance  Urinary system: urethral meatus normal  Vaginal: normal mucosa without prolapse or lesions, moderate blood consistent with menses  Cervix: normal appearance  Adnexa: no masses or tenderness  Uterus: normal single, nontender   Extremities: Normal without edema   Neurologic: Alert and oriented   Psychiatric: Normal affect, judgment and mood     MA present for exam. Pt given option of deferring pelvic exam due to menses but desired to proceed with exam with STD testing.     Assessment & Plan   Diagnoses and all orders for this visit:    1. Encounter for gynecological examination (Primary)    2. Exposure to STD  -     Chlamydia trachomatis, Neisseria gonorrhoeae, Trichomonas vaginalis, PCR - Swab, Vagina  -     HIV-1 / O / 2 Ag / Antibody 4th Generation  -     RPR  -     Hepatitis B Surface Antigen    3. Herpes simplex vulvovaginitis    4. Enlarged lymph node in neck  -     US head neck soft tissue; Future  -     Ambulatory Referral to ENT (Otolaryngology)    Other orders  -     valACYclovir (Valtrex) 500 MG tablet; Take 1 tablet by mouth Daily for 30 days.  Dispense: 30 tablet; Refill: 11        All questions answered.  Breast self exam technique reviewed and patient encouraged to perform self-exam monthly.  Discussed healthy " lifestyle modifications.  Recommended 30 minutes of aerobic exercise five times per week.  Pap def due to menstrual flow. Recommend pt return in 2 weeks for pap. Encouraged pt to keep f/u appt due to hx ARELIS 2.     Answered pt's questions regarding HSV. Reviewed options of suppressive therapy vs taking as needed. Risks and benefits of both options discussed. Pt desires to proceed with suppression as her partner does not have herpes.     Recommended pt consider additional means of contraception and handout provided.

## 2023-02-14 ENCOUNTER — TELEPHONE (OUTPATIENT)
Dept: OBSTETRICS AND GYNECOLOGY | Age: 28
End: 2023-02-14

## 2023-02-14 LAB
HBV SURFACE AG SERPL QL IA: NEGATIVE
HIV 1+2 AB+HIV1 P24 AG SERPL QL IA: NON REACTIVE
RPR SER QL: NON REACTIVE

## 2023-03-01 ENCOUNTER — OFFICE VISIT (OUTPATIENT)
Dept: OBSTETRICS AND GYNECOLOGY | Age: 28
End: 2023-03-01
Payer: MEDICAID

## 2023-03-01 VITALS
WEIGHT: 159 LBS | SYSTOLIC BLOOD PRESSURE: 120 MMHG | BODY MASS INDEX: 24.1 KG/M2 | HEIGHT: 68 IN | DIASTOLIC BLOOD PRESSURE: 70 MMHG

## 2023-03-01 DIAGNOSIS — R59.0 ENLARGED LYMPH NODE IN NECK: ICD-10-CM

## 2023-03-01 DIAGNOSIS — Z01.419 PAP TEST, AS PART OF ROUTINE GYNECOLOGICAL EXAMINATION: Primary | ICD-10-CM

## 2023-03-01 PROCEDURE — 99212 OFFICE O/P EST SF 10 MIN: CPT | Performed by: OBSTETRICS & GYNECOLOGY

## 2023-03-01 NOTE — PROGRESS NOTES
"Chief Complaint   Patient presents with   • Follow-up     2 week F/U with Pap smear, pt has no complaints today         HPI  Dionne Ag is a 27 y.o. female presents for pap smear testing. She denies any issues today. She declines neck u/s at this time due to expense. She notes she has booked it for May. She started valtrex and is tolerating well.         The following portions of the patient's history were reviewed and updated as appropriate: allergies, current medications, past family history, past medical history, past social history, past surgical history and problem list.    Review of Systems  Pertinent items are noted in HPI.    /70   Ht 172.7 cm (68\")   Wt 72.1 kg (159 lb)   LMP 02/11/2023 (Exact Date)   BMI 24.18 kg/m²         Physical Exam  Constitutional:       Appearance: Normal appearance.   Pulmonary:      Effort: Pulmonary effort is normal.   Genitourinary:     Comments: Normal external female genitalia. Normal vagina and cervix. Pap obtained. Normal uterus/adnexa.   Neurological:      General: No focal deficit present.      Mental Status: She is alert and oriented to person, place, and time.   Psychiatric:         Behavior: Behavior normal.             Diagnoses and all orders for this visit:    1. Pap test, as part of routine gynecological examination (Primary)    2. Enlarged lymph node in neck      Advised pt to call if she does not receive results of pap within 2 weeks    She has booked neck u/s in May. Recommend she schedule visit following to review. She wants to hold on ENT consult until imaging completed.           "

## 2023-03-08 LAB
CONV .: NORMAL
CYTOLOGIST CVX/VAG CYTO: NORMAL
CYTOLOGY CVX/VAG DOC CYTO: NORMAL
CYTOLOGY CVX/VAG DOC THIN PREP: NORMAL
DX ICD CODE: NORMAL
HIV 1 & 2 AB SER-IMP: NORMAL
OTHER STN SPEC: NORMAL
STAT OF ADQ CVX/VAG CYTO-IMP: NORMAL

## 2023-04-25 ENCOUNTER — TELEPHONE (OUTPATIENT)
Dept: OBSTETRICS AND GYNECOLOGY | Age: 28
End: 2023-04-25

## 2023-04-25 NOTE — TELEPHONE ENCOUNTER
Please call pt. She has a rx for valtrex 500 mg to take daily. If she has an outbreak, she needs to increase to valtrex 500 mg twice daily for 3 days to treat the outbreak.

## 2023-04-25 NOTE — TELEPHONE ENCOUNTER
PT CALLED STATING SHE CANNOT WAIT UNTIL July TO BE SEEN AND ASK IF DR RINCON CAN SEND A MEDICATION FOR A 3 DAY DOSAGE MEDICATION INSTEAD OF A DAILY MEDICATION FOR POSSIBLE HERPES OUTBREAK. PLEASE ADVISE PT.

## 2023-05-30 ENCOUNTER — TELEPHONE (OUTPATIENT)
Dept: OBSTETRICS AND GYNECOLOGY | Age: 28
End: 2023-05-30

## 2023-05-30 NOTE — TELEPHONE ENCOUNTER
Pt states she does not have health insurance right now and cancelled her appt for the US of the head. She states she will schedule once she has the money, or new insurance. Dr. Mary is aware.    Jana